# Patient Record
Sex: MALE | Race: OTHER
[De-identification: names, ages, dates, MRNs, and addresses within clinical notes are randomized per-mention and may not be internally consistent; named-entity substitution may affect disease eponyms.]

---

## 2024-08-19 ENCOUNTER — DASHBOARD ENCOUNTERS (OUTPATIENT)
Age: 34
End: 2024-08-19

## 2024-08-21 ENCOUNTER — OFFICE VISIT (OUTPATIENT)
Dept: URBAN - METROPOLITAN AREA CLINIC 48 | Facility: CLINIC | Age: 34
End: 2024-08-21

## 2024-08-21 RX ORDER — OMEPRAZOLE 40 MG/1
1 CAPSULE 30 MINUTES BEFORE MORNING MEAL CAPSULE, DELAYED RELEASE ORAL ONCE A DAY
Status: ACTIVE | COMMUNITY

## 2024-08-21 RX ORDER — ONDANSETRON 4 MG/1
1 TABLET ON THE TONGUE AND ALLOW TO DISSOLVE TABLET, ORALLY DISINTEGRATING ORAL
Status: ACTIVE | COMMUNITY

## 2024-08-21 RX ORDER — LISINOPRIL AND HYDROCHLOROTHIAZIDE 20; 25 MG/1; MG/1
1 TABLET TABLET ORAL ONCE A DAY
Status: ACTIVE | COMMUNITY

## 2024-08-21 RX ORDER — DICYCLOMINE HYDROCHLORIDE 20 MG/1
1 TABLET TABLET ORAL THREE TIMES A DAY
Status: ACTIVE | COMMUNITY

## 2024-08-21 NOTE — HPI-TODAY'S VISIT:
53-year-old male presents for evaluation of nausea, diarrhea, LLQ pain.  Per PCP note 8/19/2024, patient has history of gastric ulcers in childhood, treated with omeprazole.  Experiences occasional heartburn.  Also reports occasional vomiting after eating.  EGD at age 30 confirmed presence of ulcer.  PCP Rx'd omeprazole as needed. Also reports diarrhea, with loose stool 5 times daily, sometimes accompanied by abdominal cramping.  Previously diagnosed with IBS, and Rx dicyclomine but discontinued due to loss of insurance.  He reports remote history of dark red blood in stool. EGD/colon in 2010 showed moderately severe gastritis, no evidence of active bleeding or stigmata of recent bleeding.  Colonoscopy with very poor prep.  Air-contrast barium enema showed minimal prominence of bowel wall of proximal duodenum, possibly consistent with duodenitis. Last available labs 7/15/2023 show no evidence of celiac disease With no acute process in